# Patient Record
Sex: MALE | Race: OTHER | URBAN - METROPOLITAN AREA
[De-identification: names, ages, dates, MRNs, and addresses within clinical notes are randomized per-mention and may not be internally consistent; named-entity substitution may affect disease eponyms.]

---

## 2022-07-13 ENCOUNTER — EMERGENCY (EMERGENCY)
Facility: HOSPITAL | Age: 37
LOS: 0 days | Discharge: HOME | End: 2022-07-13
Attending: STUDENT IN AN ORGANIZED HEALTH CARE EDUCATION/TRAINING PROGRAM

## 2022-07-13 VITALS
TEMPERATURE: 98 F | RESPIRATION RATE: 16 BRPM | HEIGHT: 67 IN | OXYGEN SATURATION: 98 % | SYSTOLIC BLOOD PRESSURE: 114 MMHG | WEIGHT: 169.98 LBS | DIASTOLIC BLOOD PRESSURE: 82 MMHG | HEART RATE: 79 BPM

## 2022-07-13 DIAGNOSIS — M54.2 CERVICALGIA: ICD-10-CM

## 2022-07-13 DIAGNOSIS — R10.11 RIGHT UPPER QUADRANT PAIN: ICD-10-CM

## 2022-07-13 DIAGNOSIS — F17.210 NICOTINE DEPENDENCE, CIGARETTES, UNCOMPLICATED: ICD-10-CM

## 2022-07-13 DIAGNOSIS — M79.641 PAIN IN RIGHT HAND: ICD-10-CM

## 2022-07-13 DIAGNOSIS — M54.9 DORSALGIA, UNSPECIFIED: ICD-10-CM

## 2022-07-13 DIAGNOSIS — M25.521 PAIN IN RIGHT ELBOW: ICD-10-CM
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
## 2022-07-13 PROCEDURE — 99284 EMERGENCY DEPT VISIT MOD MDM: CPT

## 2022-07-13 PROCEDURE — 73080 X-RAY EXAM OF ELBOW: CPT | Mod: 26,RT

## 2022-07-13 PROCEDURE — 71046 X-RAY EXAM CHEST 2 VIEWS: CPT | Mod: 26

## 2022-07-13 RX ORDER — LIDOCAINE 4 G/100G
1 CREAM TOPICAL ONCE
Refills: 0 | Status: COMPLETED | OUTPATIENT
Start: 2022-07-13 | End: 2022-07-13

## 2022-07-13 RX ORDER — METHOCARBAMOL 500 MG/1
2 TABLET, FILM COATED ORAL
Qty: 12 | Refills: 0
Start: 2022-07-13 | End: 2022-07-14

## 2022-07-13 RX ORDER — ACETAMINOPHEN 500 MG
975 TABLET ORAL ONCE
Refills: 0 | Status: COMPLETED | OUTPATIENT
Start: 2022-07-13 | End: 2022-07-13

## 2022-07-13 RX ORDER — IBUPROFEN 200 MG
600 TABLET ORAL ONCE
Refills: 0 | Status: COMPLETED | OUTPATIENT
Start: 2022-07-13 | End: 2022-07-13

## 2022-07-13 NOTE — ED PROVIDER NOTE - CLINICAL SUMMARY MEDICAL DECISION MAKING FREE TEXT BOX
37yo male presenting after injury while using heavy equipment.  Well appearing here without signs of significant trauma.  Plan for XR eval for acute traumatic injuries and pain control.  Reassess.

## 2022-07-13 NOTE — ED ADULT NURSE NOTE - NSIMPLEMENTINTERV_GEN_ALL_ED
Implemented All Fall Risk Interventions:  [place] to call system. Call bell, personal items and telephone within reach. Instruct patient to call for assistance. Room bathroom lighting operational. Non-slip footwear when patient is off stretcher. Physically safe environment: no spills, clutter or unnecessary equipment. Stretcher in lowest position, wheels locked, appropriate side rails in place. Provide visual cue, wrist band, yellow gown, etc. Monitor gait and stability. Monitor for mental status changes and reorient to person, place, and time. Review medications for side effects contributing to fall risk. Reinforce activity limits and safety measures with patient and family.

## 2022-07-13 NOTE — ED ADULT NURSE NOTE - CHIEF COMPLAINT QUOTE
pt was using a machine at work to clean floor and it knocked him over Pt was at [place] till 1 am without tx c/o rt sided pain

## 2022-07-13 NOTE — ED PROVIDER NOTE - OBJECTIVE STATEMENT
37 yo male w/ no pertinent PMH presents to ED s/p fall last night at 1am. Pt was operating a cleaning machine at work when he flipped, hit his head on the wall, and fell on his back and right hand, landing with his feet under him. EMS took pt to [facility] after fall, but after not receiving treatment at 7am pt left. Endorses head strike, neck pain, right flank pain, back pain, right hand pain, buttock pain, and nausea. Denies loc. Pt states right flank pain worsens when standing and rates it 8/10. Pt ambulating w/o assistance. Took 1 dose of Tylenol ar 12pm w/o relief. Pt unsure of last tetanus. Admits to occasional etoh and tobacco use.  used ID# [id number]. 37 yo male w/ no pertinent PMH presents to ED s/p fall last night at 1am. Pt was operating a cleaning machine at work when he flipped, hit his head on the wall, and fell on his back and right hand, landing with his feet under him. EMS took pt to [facility] after fall, but after waiting for several hours pt left. Endorses head strike, neck pain, right flank pain, back pain, right hand pain, buttock pain, and nausea. Denies loc. Pt states right flank pain worsens when standing and rates it 8/10. Pt ambulating w/o assistance. Took 1 dose of Tylenol ar 12pm w/o relief. Pt unsure of last tetanus. Admits to occasional etoh and tobacco use.  used ID# [id number].

## 2022-07-13 NOTE — ED PROVIDER NOTE - PROGRESS NOTE DETAILS
Hu: Reviewed results with patient.  He does not want to stay for medication.  Answered questions, emigdio jimenez.

## 2022-07-13 NOTE — ED PROVIDER NOTE - PHYSICAL EXAMINATION
General appearance: NAD, conversant, afebrile    Eyes: anicteric sclerae, CARLOS, EOMI   HENT: Atraumatic; oropharynx clear, MMM and no ulcerations, no pharyngeal erythema or exudate   Neck: Trachea midline; Full range of motion, supple, no midline ttp   Pulm: CTA bl, normal respiratory effort and no intercostal retractions, normal work of breathing   CV: RRR, No murmurs, rubs, or gallops   Abdomen: Soft, non-tender, non-distended; no guarding or rebound   Back: No midline ttp, step offs, deformities, no cvat    Extremities: No peripheral edema, no gross deformities, FROM x4, 5/5 MS x4, gross sensation intact, TTP R elbow   Skin: Dry, normal temperature, turgor and texture; no rash   Psych: Appropriate affect, cooperative

## 2022-07-13 NOTE — ED ADULT TRIAGE NOTE - CHIEF COMPLAINT QUOTE
pt was using a machine at work to clean floor and it knocked him over Pt was at [place] till 1 am without tx c/o rt sided pain

## 2022-07-13 NOTE — ED ADULT NURSE NOTE - OBJECTIVE STATEMENT
AOx3, ambulatory. pt came in complaining of fall. pt states he was at work using a machine to clean the floor when the machine knocked him over. pt was seen at [facility] last night. pt fell over and hit his head on the floor. pt didn't loose consciousness/ isnt on blood thinners. pt states he hit his back on the floor as well. pt is complaining of R sided pain, pt rates the pain a 8/10.  pt denies any CP, SOB, fever, chills, N/V/D.     pt denies any pmh  [id number]. AOx3, ambulatory. pt came in complaining of fall. pt states he was at work using a machine to clean the floor when the machine knocked him over. pt was seen at [facility] last night. pt fell over and hit his head on the floor. pt didn't loose consciousness/ isnt on blood thinners. pt denies any dizziness/lightheadedness. pt states he hit his back on the floor as well. pt is complaining of R sided pain, pt rates the pain a 8/10.  pt denies any CP, SOB, fever, chills, N/V/D.     pt denies any pmh

## 2022-07-13 NOTE — ED PROVIDER NOTE - PATIENT PORTAL LINK FT
You can access the FollowMyHealth Patient Portal offered by [facility] by registering at the following website: [email or web address]. By joining [employer]’s FollowMyHealth portal, you will also be able to view your health information using other applications (apps) compatible with our system.

## 2022-07-13 NOTE — ED PROVIDER NOTE - NSFOLLOWUPINSTRUCTIONS_ED_ALL_ED_FT
Rest, drink plenty of fluids  Advance activity as tolerated  Continue all previously prescribed medications as directed  Follow up with your PMD - bring copies of your results  Return to the ER for chest pain, difficulty breathing, or other new or concerning symptoms   For pain you may take Tylenol 975mg every six hours and supplement with ibuprofen 600mg, with food, every six hours which can be taken three hours apart from the Tylenol to have a layered effect.   You may apply lidoderm patches to the affected area for 12 hours in a 24 hour period    Descansa, ricardo muchos líquidos.  Avanzar en la actividad según lo tolere  Continúe con todos los medicamentos recetados previamente según las indicaciones.  Shantel un seguimiento con clark PMD: traiga copias de charles resultados  Regrese a la lalit de emergencias por dolor en el pecho, dificultad para respirar u otros síntomas nuevos o preocupantes  Para el dolor, puede chasity Tylenol 975 mg cada seis horas y complementar con ibuprofeno 600 mg, con alimentos, cada seis horas, que se puede chasity audrey horas aparte del Tylenol para tener un efecto estratificado.  Puede aplicar parches de lidoderm en el área afectada hseila 12 horas en un período de 24 horas.
